# Patient Record
Sex: FEMALE | Race: WHITE | Employment: OTHER | URBAN - METROPOLITAN AREA
[De-identification: names, ages, dates, MRNs, and addresses within clinical notes are randomized per-mention and may not be internally consistent; named-entity substitution may affect disease eponyms.]

---

## 2017-01-06 ENCOUNTER — TELEPHONE (OUTPATIENT)
Dept: ORTHOPEDICS | Facility: CLINIC | Age: 82
End: 2017-01-06

## 2017-01-06 NOTE — TELEPHONE ENCOUNTER
----- Message from Mart Palumbo MD sent at 1/6/2017  3:45 PM CST -----  Regarding: FW: Family has Questions      ----- Message -----     From: Cesilia Linares RN     Sent: 1/6/2017   2:19 PM       To: Mart Palumbo MD, #  Subject: Family has Questions                             Helfrancesca,  Delmy has two daughters that would like to speak with you about possible synvisc treatment.  Please call either to discuss:  Chloe [phone]  Or her sister [phone]  Thank-you,  ORTHO Triage

## 2017-01-06 NOTE — TELEPHONE ENCOUNTER
Returned call to patient's daughter, Alvaro, and left voicemail with callback number to answer questions regarding Synvisc treatment.

## 2017-01-09 ENCOUNTER — TELEPHONE (OUTPATIENT)
Dept: ORTHOPEDICS | Facility: CLINIC | Age: 82
End: 2017-01-09

## 2017-01-09 NOTE — TELEPHONE ENCOUNTER
Called and left (w) vm  message with dtr David at [phone], explaining about making sure through insurance that Synvisc-one would be covered or need to pay expense of injection out of pocket.  Called sister Chloe at home, who referred me to David. Called David home number, who referred me to work number.  pm

## 2017-01-23 ENCOUNTER — MYC MEDICAL ADVICE (OUTPATIENT)
Dept: ORTHOPEDICS | Facility: CLINIC | Age: 82
End: 2017-01-23

## 2017-02-13 ENCOUNTER — TELEPHONE (OUTPATIENT)
Dept: ORTHOPEDICS | Facility: CLINIC | Age: 82
End: 2017-02-13

## 2017-02-13 NOTE — TELEPHONE ENCOUNTER
Christine from [facility], called requesting office notes and radiology reports for Delmy in regards to her knee. She needs them to go forward with getting aurthorization for Synvisc 1 injections. The information was faxed to the number given, [phone]

## 2017-03-08 ENCOUNTER — OFFICE VISIT (OUTPATIENT)
Dept: ORTHOPEDICS | Facility: CLINIC | Age: 82
End: 2017-03-08

## 2017-03-08 VITALS — DIASTOLIC BLOOD PRESSURE: 66 MMHG | SYSTOLIC BLOOD PRESSURE: 133 MMHG | HEART RATE: 85 BPM

## 2017-03-08 DIAGNOSIS — M17.0 PRIMARY OSTEOARTHRITIS OF BOTH KNEES: Primary | ICD-10-CM

## 2017-03-08 NOTE — NURSING NOTE
[address] [address]  Dept: [phone]  ______________________________________________________________________________    Patient: Delmy Lambert   : 1927   MRN: [id number]   2017    INVASIVE PROCEDURE SAFETY CHECKLIST    Date: 3/8/2017   Procedure:Bilateral synvisc one injections in knees  Patient Name: Delmy Lambert  MRN: [id number]  YOB: 1927    Action: Complete sections as appropriate. Any discrepancy results in a HARD COPY until resolved.     PRE PROCEDURE:  Patient ID verified with 2 identifiers (name and  or MRN): Yes  Procedure and site verified with patient/designee (when able): Yes  Accurate consent documentation in medical record: Yes  H&P (or appropriate assessment) documented in medical record: Yes  H&P must be up to 20 days prior to procedure and updates within 24 hours of procedure as applicable: NA  Relevant diagnostic and radiology test results appropriately labeled and displayed as applicable: Yes  Procedure site(s) marked with provider initials: NA    TIMEOUT:  Time-Out performed immediately prior to starting procedure, including verbal and active participation of all team members addressing the following:Yes  * Correct patient identify  * Confirmed that the correct side and site are marked  * An accurate procedure consent form  * Agreement on the procedure to be done  * Correct patient position  * Relevant images and results are properly labeled and appropriately displayed  * The need to administer antibiotics or fluids for irrigation purposes during the procedure as applicable   * Safety precautions based on patient history or medication use    DURING PROCEDURE: Verification of correct person, site, and procedures any time the responsibility for care of the patient is transferred to another member of the care team.

## 2017-03-08 NOTE — LETTER
3/8/2017      RE: Delmy Lambert  [address]     [place] [address]       March 8, 2017: Delmy Lambert is a 89 year old female who is seen in f/u up for bilateral knee injections. She is requesting synvic one injections. Her last injections were on 11/2/16.         PMH:  Past Medical History   Diagnosis Date     Glaucoma      Osteoarthritis        Active problem list:  Patient Active Problem List   Diagnosis     Glaucoma     Osteoarthritis     Hyperlipidemia LDL goal <130     Hearing difficulty     Advanced directives, counseling/discussion     Syncope     Lightheadedness       FH:  Family History   Problem Relation Age of Onset     CANCER Sister      HEART DISEASE Mother      many strokes     CEREBROVASCULAR DISEASE Mother      Myocardial Infarction Daughter      53       SH:  Social History     Social History     Marital status:      Spouse name: N/A     Number of children: N/A     Years of education: N/A     Occupational History     Not on file.     Social History Main Topics     Smoking status: Former Smoker     Packs/day: 0.50     Years: 24.00     Smokeless tobacco: Never Used     Alcohol use Yes      Comment: 2-3 glasses of wine a week     Drug use: No     Sexual activity: Not on file     Other Topics Concern     Parent/Sibling W/ Cabg, Mi Or Angioplasty Before 65f 55m? No     Social History Narrative       MEDS:  See EMR, reviewed  ALL:  See EMR, reviewed    REVIEW OF SYSTEMS:  CONSTITUTIONAL:NEGATIVE for fever, chills, change in weight  INTEGUMENTARY/SKIN: NEGATIVE for worrisome rashes, moles or lesions  EYES: NEGATIVE for vision changes or irritation  ENT/MOUTH: NEGATIVE for ear, mouth and throat problems  RESP:NEGATIVE for significant cough or SOB  BREAST: NEGATIVE for masses, tenderness or discharge  CV: NEGATIVE for chest pain, palpitations or peripheral edema  GI: NEGATIVE for nausea, abdominal pain, heartburn, or change in bowel habits  :NEGATIVE for frequency,  dysuria, or hematuria  :NEGATIVE for frequency, dysuria, or hematuria  NEURO: NEGATIVE for weakness, dizziness or paresthesias  ENDOCRINE: NEGATIVE for temperature intolerance, skin/hair changes  HEME/ALLERGY/IMMUNE: NEGATIVE for bleeding problems  PSYCHIATRIC: NEGATIVE for changes in mood or affect          SUBJECTIVE:  This 89-year-old female is seen in followup for knee DJD.  The last cortisone injections gave her 2 months' worth of pain relief.  She is against doing a knee replacement.  She has checked with her insurance company and she would like to try Synvisc injections to see if they provide longer relief.      OBJECTIVE:  The bilateral knees reveal no effusion and no signs of cellulitis.  I can flex and extend both knees fully.  She has some tenderness over the medial joint lines, nontender over the lateral joint lines, nontender over the pes anserine bursa or the distal ICA band, and after informed consent about bleeding, infection or steroid flare and after infiltrating with 1% lidocaine in each knee, she was injected with Synvisc-One into both the left and to the right knee intra-articular joint space from a lateral approach in the seated position.  The medicine went in easily.  She left the clinic ambulatory.  She will look for improvements with the injection over the next 2 weeks.  She will follow up as needed.         Seth Deleon MD

## 2017-03-08 NOTE — PROGRESS NOTES
March 8, 2017: Delmy Lambert is a 89 year old female who is seen in f/u up for bilateral knee injections. She is requesting synvic one injections. Her last injections were on 11/2/16.         PMH:  Past Medical History   Diagnosis Date     Glaucoma      Osteoarthritis        Active problem list:  Patient Active Problem List   Diagnosis     Glaucoma     Osteoarthritis     Hyperlipidemia LDL goal <130     Hearing difficulty     Advanced directives, counseling/discussion     Syncope     Lightheadedness       FH:  Family History   Problem Relation Age of Onset     CANCER Sister      HEART DISEASE Mother      many strokes     CEREBROVASCULAR DISEASE Mother      Myocardial Infarction Daughter      53       SH:  Social History     Social History     Marital status:      Spouse name: N/A     Number of children: N/A     Years of education: N/A     Occupational History     Not on file.     Social History Main Topics     Smoking status: Former Smoker     Packs/day: 0.50     Years: 24.00     Smokeless tobacco: Never Used     Alcohol use Yes      Comment: 2-3 glasses of wine a week     Drug use: No     Sexual activity: Not on file     Other Topics Concern     Parent/Sibling W/ Cabg, Mi Or Angioplasty Before 65f 55m? No     Social History Narrative       MEDS:  See EMR, reviewed  ALL:  See EMR, reviewed    REVIEW OF SYSTEMS:  CONSTITUTIONAL:NEGATIVE for fever, chills, change in weight  INTEGUMENTARY/SKIN: NEGATIVE for worrisome rashes, moles or lesions  EYES: NEGATIVE for vision changes or irritation  ENT/MOUTH: NEGATIVE for ear, mouth and throat problems  RESP:NEGATIVE for significant cough or SOB  BREAST: NEGATIVE for masses, tenderness or discharge  CV: NEGATIVE for chest pain, palpitations or peripheral edema  GI: NEGATIVE for nausea, abdominal pain, heartburn, or change in bowel habits  :NEGATIVE for frequency, dysuria, or hematuria  :NEGATIVE for frequency, dysuria, or hematuria  NEURO: NEGATIVE for weakness,  dizziness or paresthesias  ENDOCRINE: NEGATIVE for temperature intolerance, skin/hair changes  HEME/ALLERGY/IMMUNE: NEGATIVE for bleeding problems  PSYCHIATRIC: NEGATIVE for changes in mood or affect          SUBJECTIVE:  This 89-year-old female is seen in followup for knee DJD.  The last cortisone injections gave her 2 months' worth of pain relief.  She is against doing a knee replacement.  She has checked with her insurance company and she would like to try Synvisc injections to see if they provide longer relief.      OBJECTIVE:  The bilateral knees reveal no effusion and no signs of cellulitis.  I can flex and extend both knees fully.  She has some tenderness over the medial joint lines, nontender over the lateral joint lines, nontender over the pes anserine bursa or the distal ICA band, and after informed consent about bleeding, infection or steroid flare and after infiltrating with 1% lidocaine in each knee, she was injected with Synvisc-One into both the left and to the right knee intra-articular joint space from a lateral approach in the seated position.  The medicine went in easily.  She left the clinic ambulatory.  She will look for improvements with the injection over the next 2 weeks.  She will follow up as needed.

## 2017-03-08 NOTE — MR AVS SNAPSHOT
After Visit Summary   3/8/2017    Delmy Lambert    MRN: [id number]           Patient Information     Date Of Birth          12/18/1927        Visit Information        Provider Department      3/8/2017 2:30 PM Seth Deleon MD [facility]        Today's Diagnoses     Primary osteoarthritis of both knees    -  1       Follow-ups after your visit        Who to contact     Please call your clinic at [phone] to:    Ask questions about your health    Make or cancel appointments    Discuss your medicines    Learn about your test results    Speak to your doctor   If you have compliments or concerns about an experience at your clinic, or if you wish to file a complaint, please contact [facility] Patient Relations at [phone] or email us at [email or web address]         Additional Information About Your Visit        MyChart Information     [employer] gives you secure access to your electronic health record. If you see a primary care provider, you can also send messages to your care team and make appointments. If you have questions, please call your primary care clinic.  If you do not have a primary care provider, please call [phone] and they will assist you.      [employer] is an electronic gateway that provides easy, online access to your medical records. With [employer], you can request a clinic appointment, read your test results, renew a prescription or communicate with your care team.     To access your existing account, please contact your [facility] or call [phone] for assistance.        Care EveryWhere ID     This is your Care EveryWhere ID. This could be used by other organizations to access your [facility] medical records  [id number]        Your Vitals Were     Pulse                   85            Blood Pressure from Last 3 Encounters:   03/08/17 133/66   06/14/15 153/73   03/25/15 120/80    Weight from  Last 3 Encounters:   06/13/15 160 lb (72.6 kg)   03/25/15 164 lb (74.4 kg)   07/16/14 164 lb (74.4 kg)              We Performed the Following     C SYNVISC PER 1 MG     Large Joint/Bursa injection and/or drainage - Bilateral (Shoulder, Knee) [20610] [50 Mod]          Today's Medication Changes          These changes are accurate as of: 3/8/17 11:59 PM.  If you have any questions, ask your nurse or doctor.               Start taking these medicines.        Dose/Directions    Hylan 48 MG/6ML Sosy injection   Commonly known as:  SYNVISC ONE   Used for:  Primary osteoarthritis of both knees        Dose:  48 mg   48 mg by INTRA-ARTICULAR route once for 1 dose   Quantity:  6 mL   Refills:  0         These medicines have changed or have updated prescriptions.        Dose/Directions    calcium carb 1250 mg (500 mg [place])/vitamin D 200 units 500-200 MG-UNIT per tablet   Commonly known as:  OSCAL with D   This may have changed:  when to take this   Used for:  Osteoarthritis        Dose:  1 tablet   Take 1 tablet by mouth 2 times daily (with meals)   Quantity:  180 tablet   Refills:  0            Where to get your medicines      Some of these will need a paper prescription and others can be bought over the counter.  Ask your nurse if you have questions.     You don't need a prescription for these medications     Hylan 48 MG/6ML Sosy injection                Primary Care Provider Office Phone # Fax #    Ignacio Maldonado [phone]       [address]  [facility] [address]        Thank you!     Thank you for choosing [facility]  for your care. Our goal is always to provide you with excellent care. Hearing back from our patients is one way we can continue to improve our services. Please take a few minutes to complete the written survey that you may receive in the mail after your visit with us. Thank you!             Your Updated Medication List - Protect others around you: Learn  how to safely use, store and throw away your medicines at www.disposemymeds.org.          This list is accurate as of: 3/8/17 11:59 PM.  Always use your most recent med list.                   Brand Name Dispense Instructions for use    aspirin 81 MG tablet      Take  by mouth daily.       calcium carb 1250 mg (500 mg [place])/vitamin D 200 units 500-200 MG-UNIT per tablet    OSCAL with D    180 tablet    Take 1 tablet by mouth 2 times daily (with meals)       celecoxib 200 MG capsule    celeBREX    90 capsule    Take 1 capsule (200 mg) by mouth daily       fish oil-omega-3 fatty acids 1000 MG capsule      Take 1 g by mouth daily       GLUCOSAMINE CHONDR COMPLEX PO      Take 1 tablet by mouth daily       Hylan 48 MG/6ML Sosy injection    SYNVISC ONE    6 mL    48 mg by INTRA-ARTICULAR route once for 1 dose       METOPROLOL TARTRATE PO          MULTIVITAMIN PO      Take 1 tablet by mouth daily       order for DME     1 Units    Walker       sodium chloride 5 % ophthalmic ointment    BOOGIE 128    3.5 g    Place 1 Application into both eyes At Bedtime       timolol hemihydrate 0.5 % Soln ophthalmic solution    BETIMOL     Place 2 drops into the right eye every morning       TRAVATAN 0.004 % ophthalmic solution   Generic drug:  travoprost Z (benzalkonium)      Place 1 drop into the right eye every evening       TYLENOL 325 MG tablet   Generic drug:  acetaminophen     100 tablet    Take 500 mg by mouth daily

## 2019-01-03 ENCOUNTER — TELEPHONE (OUTPATIENT)
Dept: AUDIOLOGY | Facility: CLINIC | Age: 84
End: 2019-01-03

## 2019-01-03 NOTE — TELEPHONE ENCOUNTER
[facility]    Phone Message    May a detailed message be left on voicemail: yes    Reason for Call: Other: Pt's daughter Chloe calling, requesting a status update on the repair of pt's hearing aid. She states they had dropped it off approx 3 weeks ago, and had not heard back. They were hoping to pick it up when it was finished. Please contact Chloe either way when available.    Action Taken: Message routed to:  [facility] Audiology

## 2019-01-08 NOTE — TELEPHONE ENCOUNTER
[facility]    Phone Message    May a detailed message be left on voicemail: yes    Reason for Call: Other: Pt wants to know if her hearing Aids have came back, please follow up with pt      Action Taken: Message routed to:  [facility] Audiology

## 2019-01-10 ENCOUNTER — OFFICE VISIT (OUTPATIENT)
Dept: AUDIOLOGY | Facility: CLINIC | Age: 84
End: 2019-01-10

## 2019-01-10 DIAGNOSIS — H90.A22 SENSORINEURAL HEARING LOSS (SNHL) OF LEFT EAR WITH RESTRICTED HEARING OF RIGHT EAR: Primary | ICD-10-CM

## 2019-01-10 DIAGNOSIS — H90.A31 MIXED CONDUCTIVE AND SENSORINEURAL HEARING LOSS OF RIGHT EAR WITH RESTRICTED HEARING OF LEFT EAR: ICD-10-CM

## 2019-02-11 ENCOUNTER — TELEPHONE (OUTPATIENT)
Dept: AUDIOLOGY | Facility: CLINIC | Age: 84
End: 2019-02-11

## 2019-02-12 NOTE — TELEPHONE ENCOUNTER
[facility]    Phone Message    May a detailed message be left on voicemail: yes    Reason for Call: Other: is requesting a receipt for hearing aid repairs(statement of what was done to hearing aid and how much it was and it was paid for) , as the insurance is now stating they would have covered the repairs.      Action Taken: Message routed to:  [facility] FATOU Reese

## 2019-08-09 ENCOUNTER — RECORDS - HEALTHEAST (OUTPATIENT)
Dept: LAB | Facility: CLINIC | Age: 84
End: 2019-08-09

## 2019-08-09 LAB
ALBUMIN UR-MCNC: ABNORMAL MG/DL
APPEARANCE UR: CLEAR
BACTERIA #/AREA URNS HPF: ABNORMAL HPF
BILIRUB UR QL STRIP: NEGATIVE
COLOR UR AUTO: YELLOW
GLUCOSE UR STRIP-MCNC: NEGATIVE MG/DL
HGB UR QL STRIP: ABNORMAL
KETONES UR STRIP-MCNC: NEGATIVE MG/DL
LEUKOCYTE ESTERASE UR QL STRIP: ABNORMAL
MUCOUS THREADS #/AREA URNS LPF: ABNORMAL LPF
NITRATE UR QL: NEGATIVE
PH UR STRIP: 6 [PH] (ref 4.5–8)
RBC #/AREA URNS AUTO: ABNORMAL HPF
RENAL EPI CELLS #/AREA URNS HPF: ABNORMAL LPF
SP GR UR STRIP: 1.02 (ref 1–1.03)
SQUAMOUS #/AREA URNS AUTO: ABNORMAL LPF
TRANS CELLS #/AREA URNS HPF: ABNORMAL LPF
UROBILINOGEN UR STRIP-ACNC: ABNORMAL
WBC #/AREA URNS AUTO: ABNORMAL HPF

## 2019-08-10 LAB — BACTERIA SPEC CULT: NORMAL

## 2019-10-04 ENCOUNTER — HEALTH MAINTENANCE LETTER (OUTPATIENT)
Age: 84
End: 2019-10-04

## 2020-02-09 ENCOUNTER — HEALTH MAINTENANCE LETTER (OUTPATIENT)
Age: 85
End: 2020-02-09

## 2020-08-18 ENCOUNTER — RECORDS - HEALTHEAST (OUTPATIENT)
Dept: LAB | Facility: CLINIC | Age: 85
End: 2020-08-18

## 2020-08-21 LAB
SARS-COV-2 BY NAA - HISTORICAL: NOT DETECTED
SARS-COV-2 SOURCE - HISTORICAL: NORMAL

## 2020-09-01 ENCOUNTER — RECORDS - HEALTHEAST (OUTPATIENT)
Dept: LAB | Facility: CLINIC | Age: 85
End: 2020-09-01

## 2020-09-05 LAB
SARS-COV-2 BY NAA - HISTORICAL: NOT DETECTED
SARS-COV-2 SOURCE - HISTORICAL: NORMAL

## 2020-09-09 ENCOUNTER — RECORDS - HEALTHEAST (OUTPATIENT)
Dept: LAB | Facility: CLINIC | Age: 85
End: 2020-09-09

## 2020-09-14 LAB
SARS-COV-2 BY NAA - HISTORICAL: NOT DETECTED
SARS-COV-2 SOURCE - HISTORICAL: NORMAL

## 2020-11-08 ENCOUNTER — HEALTH MAINTENANCE LETTER (OUTPATIENT)
Age: 85
End: 2020-11-08

## 2020-12-31 ENCOUNTER — RECORDS - HEALTHEAST (OUTPATIENT)
Dept: LAB | Facility: CLINIC | Age: 85
End: 2020-12-31

## 2020-12-31 LAB
SARS-COV-2 PCR COMMENT: ABNORMAL
SARS-COV-2 RNA SPEC QL NAA+PROBE: POSITIVE
SARS-COV-2 VIRUS SPECIMEN SOURCE: ABNORMAL

## 2021-01-25 ENCOUNTER — DOCUMENTATION ONLY (OUTPATIENT)
Dept: OTHER | Facility: CLINIC | Age: 86
End: 2021-01-25

## 2021-03-28 ENCOUNTER — HEALTH MAINTENANCE LETTER (OUTPATIENT)
Age: 86
End: 2021-03-28

## 2021-04-01 ENCOUNTER — RECORDS - HEALTHEAST (OUTPATIENT)
Dept: LAB | Facility: CLINIC | Age: 86
End: 2021-04-01

## 2021-04-01 LAB
SARS-COV-2 PCR COMMENT: NORMAL
SARS-COV-2 RNA SPEC QL NAA+PROBE: NEGATIVE
SARS-COV-2 VIRUS SPECIMEN SOURCE: NORMAL

## 2021-04-15 ENCOUNTER — RECORDS - HEALTHEAST (OUTPATIENT)
Dept: LAB | Facility: CLINIC | Age: 86
End: 2021-04-15

## 2021-04-29 ENCOUNTER — RECORDS - HEALTHEAST (OUTPATIENT)
Dept: LAB | Facility: CLINIC | Age: 86
End: 2021-04-29

## 2021-09-12 ENCOUNTER — HEALTH MAINTENANCE LETTER (OUTPATIENT)
Age: 86
End: 2021-09-12

## 2022-04-23 ENCOUNTER — HEALTH MAINTENANCE LETTER (OUTPATIENT)
Age: 87
End: 2022-04-23

## 2022-10-30 ENCOUNTER — HEALTH MAINTENANCE LETTER (OUTPATIENT)
Age: 87
End: 2022-10-30

## 2023-06-01 ENCOUNTER — HEALTH MAINTENANCE LETTER (OUTPATIENT)
Age: 88
End: 2023-06-01

## (undated) RX ORDER — LIDOCAINE HYDROCHLORIDE 10 MG/ML
INJECTION, SOLUTION INFILTRATION; PERINEURAL
Status: DISPENSED
Start: 2017-03-08